# Patient Record
Sex: FEMALE | Race: WHITE | NOT HISPANIC OR LATINO | Employment: UNEMPLOYED
[De-identification: names, ages, dates, MRNs, and addresses within clinical notes are randomized per-mention and may not be internally consistent; named-entity substitution may affect disease eponyms.]

---

## 2024-07-30 ENCOUNTER — TRANSCRIBE ORDERS (OUTPATIENT)
Dept: OTHER | Age: 69
End: 2024-07-30

## 2024-07-30 DIAGNOSIS — H02.9 LESION OF LOWER EYELID: Primary | ICD-10-CM

## 2024-09-15 ENCOUNTER — HEALTH MAINTENANCE LETTER (OUTPATIENT)
Age: 69
End: 2024-09-15

## 2025-06-04 ENCOUNTER — TRANSCRIBE ORDERS (OUTPATIENT)
Dept: OTHER | Age: 70
End: 2025-06-04

## 2025-06-04 DIAGNOSIS — H02.9 EYELID LESION: Primary | ICD-10-CM

## 2025-07-09 ENCOUNTER — OFFICE VISIT (OUTPATIENT)
Dept: OPHTHALMOLOGY | Facility: CLINIC | Age: 70
End: 2025-07-09
Payer: MEDICARE

## 2025-07-09 DIAGNOSIS — H02.9 LESION OF RIGHT LOWER EYELID: Primary | ICD-10-CM

## 2025-07-09 RX ORDER — ERYTHROMYCIN 5 MG/G
OINTMENT OPHTHALMIC ONCE
Status: COMPLETED | OUTPATIENT
Start: 2025-07-09 | End: 2025-07-09

## 2025-07-09 RX ADMIN — ERYTHROMYCIN: 5 OINTMENT OPHTHALMIC at 14:53

## 2025-07-09 ASSESSMENT — VISUAL ACUITY
OS_SC: 20/40
OD_SC: 20/30
METHOD: SNELLEN - LINEAR
OD_SC+: -1

## 2025-07-09 ASSESSMENT — TONOMETRY
OD_IOP_MMHG: 20
IOP_METHOD: ICARE
OS_IOP_MMHG: 19

## 2025-07-09 ASSESSMENT — SLIT LAMP EXAM - LIDS: COMMENTS: NORMAL

## 2025-07-09 NOTE — PROGRESS NOTES
[facility] New Patient    Patient: Keren Elmore MRN# [id number]   YOB: 1955 Age: 69 year old   Date of Visit: Jul 9, 2025         CC: Eyelid lesion    Chief Complaint(s) and History of Present Illness(es)     Lesion On Right Lower Lid            Laterality: right lower lid    Course: stable    Associated symptoms: Negative for lid swelling, lid pain and discharge          Comments    Here for RLL lesion. Referred by Dr. Varela. VA is okay. No pain or   swelling.    Jarvis Tapia, COMT 2:21 PM July 9, 2025                       HPI:     Keren Elmore is a 69 year old female who has noted a lesion on the right lower eyelid. It has been present for 2 years. This has not been biopsied.    Enlarging: Yes  Irritating to eyelashes and catches in folds of skin: No  Bleeding: No  Prior cutaneous malignancy: No but has siblings with skin cancer  Immunosuppression: No    Former smoker       Assessment and Plan:   1. right lower eyelid lesion     Concerning for basal cell carcinoma involving lateral ~ 1/4 of the eyelid. I recommend biopsy today and if path confirms Mohs and recon, likely tenzel versus hewes + modfied trippier.         Operative Note - Eyelid Biopsy      Pre-operative Diagnosis: Lesion right lower eyelid    Post-operative Diagnosis: Same.    Procedure: Excision of eyelid neoplasm.    Surgeon: Aric Nava MD    Assistant: Jam Corona MD    Anesthesia: Local infiltration with 2% Lidocaine and Epinephrine.    Complications: None.    Estimated blood loss: <5 mL    Specimen: Eyelid neoplasm to pathology.    Procedure: The patient was brought to the minor procedure room and placed supine on the operating table.  The involved eyelid was infiltrated with local anesthetic.  The area was prepped and draped in the typical sterile fashion.  A tooth forceps was used to elevate the lesion and it was excised at its base with a Radha scissors.  Hemostasis was obtained with a high temperature  cautery.  The excised diameter measured 3 mm on the eyelid margin.    Closure of wound: Granulation    Dressing: The wound was dressed with Erythromycin ophthalmic ointment.     The patient left the minor procedure room in stable condition.    I was present for the entire procedure. Aric Nava MD                          Attending Physician Attestation: Complete documentation of historical and exam elements from today's encounter can be found in the full encounter summary report (not reduplicated in this progress note). I personally obtained the chief complaint(s) and history of present illness. I confirmed and edited as necessary the review of systems, past medical/surgical history, family history, social history, and examination findings as documented by others; and I examined the patient myself. I personally reviewed the relevant tests, images, and reports as documented above. I formulated and edited as necessary the assessment and plan and discussed the findings and management plan with the patient. Aric Nava MD    Today with Keren Elmore, I reviewed the indications, risks, benefits, and alternatives of the proposed surgical procedure including, but not limited to, failure obtain the desired result  and need for additional surgery, bleeding, infection, loss of vision, injury to the eye, and the remote possibility of permanent damage to any organ system or death with the use of anesthesia.  We also discussed risks of surgery including scar, infection, bleeding, need for revision, low risk of recurrence. I provided multiple opportunities for the questions, answered all questions to the best of my ability, and confirmed that my answers and my discussion were understood. Aric Nava MD

## 2025-07-09 NOTE — PATIENT INSTRUCTIONS
Use cold compresses 10 minutes every hour for the first 48 hours while awake to minimize bruising and swelling. It works well to put a washcloth in a bowl of ice water and use the cold washcloth.     Use a warm compress 5-10 minutes 4 times per day for the next 2 days or as needed.     Apply the prescribed/provided ointment to the incision three times a day for 5 days.

## 2025-07-16 ENCOUNTER — TELEPHONE (OUTPATIENT)
Dept: OPHTHALMOLOGY | Facility: CLINIC | Age: 70
End: 2025-07-16
Payer: MEDICARE

## 2025-07-16 NOTE — TELEPHONE ENCOUNTER
[facility] Call Center    Phone Message    May a detailed message be left on voicemail: yes     Reason for Call: Other: Argenis with [employer] calling that Dr Varela looked at the pathology report and said the margins weren't clear. The cup is not big enough to eliminate cancer.     Argenis wanted to know if we're planning to have patient come back in but writer did not see any notes for future appointment.     Argenis can be reached at [phone] with questions and Argenis is requesting that we call patient back at [phone].     Action Taken: Message routed to:  [facility] ([facility] Eye    Travel Screening: Not Applicable

## 2025-07-16 NOTE — TELEPHONE ENCOUNTER
I called and LVM for Argenis with Dr. Varela letting her know that Dr. Nava is aware of path report and already has the surgery scheduler working on scheduling Mohs and reconstruction. I left the direct number for Esme the surgery scheduler if she had further questions regarding where things are.  Thanks,  Janessa Jewell RN RN 3:28 PM 07/16/25

## 2025-07-19 ENCOUNTER — HEALTH MAINTENANCE LETTER (OUTPATIENT)
Age: 70
End: 2025-07-19

## 2025-07-23 PROBLEM — C44.1122 BASAL CELL CARCINOMA (BCC) OF RIGHT LOWER EYELID: Status: ACTIVE | Noted: 2025-07-09

## 2025-07-25 ENCOUNTER — TELEPHONE (OUTPATIENT)
Dept: DERMATOLOGY | Facility: CLINIC | Age: 70
End: 2025-07-25
Payer: MEDICARE

## 2025-07-25 NOTE — TELEPHONE ENCOUNTER
----- Message -----   From: Pauline Torres   Sent: 7/23/2025   3:53 PM CDT   To: [facility] [place]   Subject: Mohs with Dr. Pacheco                             Good Afternoon,     Keren would like to get scheduled for a Mohs procedure with Dr. Pacheco on 9-22-25 in [place]. Dr. Nava would do the reconstruction in the OR to follow.     Eyelid Lower Right:   - Basal cell carcinoma, nodular type, extending to the lateral and deep margins       Let me know if that date works with your schedule.     Thanks,   Esme

## 2025-07-25 NOTE — TELEPHONE ENCOUNTER
Called patient and LVM to call back [phone]. Scheduled patient for MOHS procedure with Dr. Pacheco on 9/22/25 at 8:00AM.     Please confirm this appointment time, schedule telephone consult, allergies, medications, and excision checklist.     Cecy Adams on 7/25/2025 at 10:54 AM

## 2025-07-28 RX ORDER — HYDROCHLOROTHIAZIDE 25 MG/1
25 TABLET ORAL EVERY MORNING
COMMUNITY
Start: 2025-01-15

## 2025-07-28 RX ORDER — LATANOPROST 50 UG/ML
1 SOLUTION/ DROPS OPHTHALMIC AT BEDTIME
COMMUNITY
Start: 2025-06-02

## 2025-07-28 RX ORDER — FLUTICASONE FUROATE, UMECLIDINIUM BROMIDE AND VILANTEROL TRIFENATATE 100; 62.5; 25 UG/1; UG/1; UG/1
1 POWDER RESPIRATORY (INHALATION) DAILY
COMMUNITY

## 2025-07-28 RX ORDER — ALBUTEROL SULFATE 90 UG/1
2 INHALANT RESPIRATORY (INHALATION) EVERY 4 HOURS PRN
COMMUNITY
Start: 2025-04-28 | End: 2026-04-28

## 2025-07-28 NOTE — TELEPHONE ENCOUNTER
Excision/Mohs previsit information                                                    Diagnosis: basal cell carcinoma  Site(s): R lower eyelid    Is the surgical site below the waist?  NO  If yes, instruct the patient to purchase over the counter chlorhexidine surgical soap and wash all skin below the belly button twice before surgery    Allergies   Allergen Reactions    Penicillins Unknown    Simvastatin Other (See Comments)     Aches & pain       Review and update allergy and medication list    Do you take the following medications:  Coumadin, Eliquis, Pradaxa, Xarelto:  NO.  If on Coumadin, INR should be checked within 7 days of surgery.  Range should be 3.5 or less or within therapeutic range.    Do you currently or have you previously had any of the following conditions:  Hepatitis:  NO  HIV/AIDS:  NO  Prolonged bleeding or bleeding disorder:  NO  Pacemaker: NO  Defibrillator:  NO  History of artificial or heart valve replacement:  NO  Endocarditis (inflammation of the inner lining of the heart's chambers and valves):  NO  Have you ever had a prosthetic joint infection:  NO  Pregnant or Breastfeeding:  NO  Mobility device (wheelchair, transfer difficulty): NO    Important Reminders:                                                      -For Mohs, notify patient they may be here through the lunch hour.  Be prepared to have down time and we recommend they bring a book or something to do.  -Ok to take all of their medications as prescribed  -Notify patient to eat prior to appointment.  The medication is more likely to cause you to feel jittery if you have an empty stomach.  -If face is being treated, please come with a make-up free face  -Avoid wearing earrings and necklaces  -If scalp is being treated, please come with clean hair free from product  -Patient will not be able to get the site wet for 48 hrs  -No submerging wound in standing water (lake, pool, bathtub, hot tub) for 2 weeks  -No physical activity for  48 hrs (further restrictions will be discussed by MD at time of visit)    If any positives, send to RN for further review  Pauline Al RN

## 2025-07-28 NOTE — TELEPHONE ENCOUNTER
I confirmed [facility] appointment date/time with Keren. I scheduled her for a telephone consult on 8/27. Pre visit checklist complete.

## 2025-08-27 ENCOUNTER — VIRTUAL VISIT (OUTPATIENT)
Dept: DERMATOLOGY | Facility: CLINIC | Age: 70
End: 2025-08-27
Payer: MEDICARE

## 2025-08-27 DIAGNOSIS — C44.1122 BASAL CELL CARCINOMA (BCC) OF RIGHT LOWER EYELID: Primary | ICD-10-CM
